# Patient Record
Sex: FEMALE | Race: WHITE | NOT HISPANIC OR LATINO | Employment: FULL TIME | URBAN - NONMETROPOLITAN AREA
[De-identification: names, ages, dates, MRNs, and addresses within clinical notes are randomized per-mention and may not be internally consistent; named-entity substitution may affect disease eponyms.]

---

## 2018-02-14 ENCOUNTER — OFFICE VISIT (OUTPATIENT)
Dept: GASTROENTEROLOGY | Facility: CLINIC | Age: 58
End: 2018-02-14

## 2018-02-14 VITALS
WEIGHT: 158 LBS | HEIGHT: 66 IN | DIASTOLIC BLOOD PRESSURE: 80 MMHG | HEART RATE: 78 BPM | BODY MASS INDEX: 25.39 KG/M2 | SYSTOLIC BLOOD PRESSURE: 138 MMHG | OXYGEN SATURATION: 97 %

## 2018-02-14 DIAGNOSIS — Z12.11 ENCOUNTER FOR SCREENING FOR MALIGNANT NEOPLASM OF COLON: Primary | ICD-10-CM

## 2018-02-14 PROCEDURE — S0285 CNSLT BEFORE SCREEN COLONOSC: HCPCS | Performed by: CLINICAL NURSE SPECIALIST

## 2018-02-14 RX ORDER — SODIUM, POTASSIUM,MAG SULFATES 17.5-3.13G
SOLUTION, RECONSTITUTED, ORAL ORAL
Qty: 2 BOTTLE | Refills: 0 | Status: SHIPPED | OUTPATIENT
Start: 2018-02-14 | End: 2018-05-24 | Stop reason: HOSPADM

## 2018-02-14 RX ORDER — ATORVASTATIN CALCIUM 10 MG/1
10 TABLET, FILM COATED ORAL DAILY
COMMUNITY

## 2018-02-14 NOTE — PROGRESS NOTES
Magda Mcgraw  1960      2/14/2018  Chief Complaint   Patient presents with   • Colonoscopy     New patient ref by Dr. Dayanara SERRANO  Magda Mcgraw is a 58 y.o. female who presents as a referral for preventative maintenance. She has no complaints of nausea or vomiting. No change in bowels. No wt loss. No BRBPR. No melena. There is no family hx for colon cancer. No abdominal pain.  Past Medical History:   Diagnosis Date   • Disease of thyroid gland    • Hypercholesteremia    • Hyperlipidemia      Past Surgical History:   Procedure Laterality Date   • CHOLECYSTECTOMY     • CYSTOSCOPY W/ LITHOLAPAXY / EHL     • HYSTERECTOMY     • TONSILLECTOMY       Outpatient Prescriptions Marked as Taking for the 2/14/18 encounter (Office Visit) with DAY Carrera   Medication Sig Dispense Refill   • atorvastatin (LIPITOR) 10 MG tablet Take 10 mg by mouth Daily.     • clonazePAM (KlonoPIN) 1 MG tablet Take 1 mg by mouth 2 (Two) Times a Day As Needed for Seizures.     • levothyroxine (SYNTHROID, LEVOTHROID) 25 MCG tablet Take 25 mcg by mouth Daily.     • tiZANidine (ZANAFLEX) 4 MG tablet Take 4 mg by mouth 3 (Three) Times a Day.     • traMADol (ULTRAM) 50 MG tablet Take 50 mg by mouth Every 6 (Six) Hours As Needed for Moderate Pain .       No Known Allergies  Social History     Social History   • Marital status:      Spouse name: N/A   • Number of children: N/A   • Years of education: N/A     Occupational History   • Not on file.     Social History Main Topics   • Smoking status: Former Smoker   • Smokeless tobacco: Never Used   • Alcohol use Yes      Comment: Occasional   • Drug use: Not on file   • Sexual activity: Not on file     Other Topics Concern   • Not on file     Social History Narrative     Family History   Problem Relation Age of Onset   • Colon cancer Neg Hx    • Colon polyps Neg Hx      Health Maintenance   Topic Date Due   • TDAP/TD VACCINES (1 - Tdap) 01/10/1977   • INFLUENZA  "VACCINE  08/01/2017   • HEPATITIS C SCREENING  02/09/2018   • MAMMOGRAM  02/09/2018   • PAP SMEAR  02/09/2018   • COLONOSCOPY  02/09/2018   • ZOSTER VACCINE  02/09/2018   • LIPID PANEL  02/14/2018       REVIEW OF SYSTEMS  General: well appearing, no fever chills or sweats, no unexplained wt loss  HEENT: no acute visual or hearing disturbances  Cardiovascular: No chest pain or palpitations  Pulmonary: No shortness of breath, coughing, wheezing or hemoptysis  : No burning, urgency, hematuria, or dysuria  Musculoskeletal: No joint pain or stiffness  Peripheral: no edema  Skin: No lesions or rashes  Neuro: No dizziness, headaches, stroke, syncope  Endocrine: No hot or cold intolerances  Hematological: No blood dyscrasias    Objective   Vitals:    02/14/18 1319   BP: 138/80   Pulse: 78   SpO2: 97%   Weight: 71.7 kg (158 lb)   Height: 167.6 cm (66\")     Body mass index is 25.5 kg/(m^2).  Patient's BMI is within normal parameters. No follow-up required.      PHYSICAL EXAM  General: age appropriate well nourished well appearing, no acute distress  Head: normocephalic and atraumatic  Global assessment-supple  Neck-No JVD noted, no lymphadenopathy  Pulmonary-clear to auscultation bilaterally, normal respiratory effort  Cardiovascular-normal rate and rhythm, normal heart sounds, S1 and S2 noted  Abdomen-soft, non tender, non distended, normal bowel sounds all 4 quadrants, no hepatosplenomegaly noted  Extremities-No clubbing cyanosis or edema  Neuro-Non focal, converses appropriately, awake, alert, oriented    Assessment/Plan     Magda was seen today for colonoscopy.    Diagnoses and all orders for this visit:    Encounter for screening for malignant neoplasm of colon  -     SUPREP BOWEL PREP KIT 17.5-3.13-1.6 GM/180ML solution oral solution; Take as directed by office instructions provided  -     Case Request; Standing  -     Implement Anesthesia Orders Day of Procedure; Standing  -     Obtain Informed Consent; Standing  -  "    Verify bowel prep was successful; Standing  -     Case Request        COLONOSCOPY WITH ANESTHESIA (N/A)  Body mass index is 25.5 kg/(m^2).    Patient instructions on prep prior to procedure provided to the patient.    All risks, benefits, alternatives, and indications of colonoscopy procedure have been discussed with the patient. Risks to include perforation of the colon requiring possible surgery or colostomy, risk of bleeding from biopsies or removal of colon tissue, possibility of missing a colon polyp or cancer, or adverse drug reaction.  Benefits to include the diagnosis and management of disease of the colon and rectum. Alternatives to include barium enema, radiographic evaluation, lab testing or no intervention. Pt verbalizes understanding and agrees.     Shirley Baig, APRN  2/14/2018  1:30 PM

## 2018-05-24 ENCOUNTER — ANESTHESIA EVENT (OUTPATIENT)
Dept: GASTROENTEROLOGY | Facility: HOSPITAL | Age: 58
End: 2018-05-24

## 2018-05-24 ENCOUNTER — ANESTHESIA (OUTPATIENT)
Dept: GASTROENTEROLOGY | Facility: HOSPITAL | Age: 58
End: 2018-05-24

## 2018-05-24 ENCOUNTER — HOSPITAL ENCOUNTER (OUTPATIENT)
Facility: HOSPITAL | Age: 58
Setting detail: HOSPITAL OUTPATIENT SURGERY
Discharge: HOME | End: 2018-05-24
Attending: INTERNAL MEDICINE | Admitting: INTERNAL MEDICINE

## 2018-05-24 VITALS
WEIGHT: 159 LBS | RESPIRATION RATE: 13 BRPM | DIASTOLIC BLOOD PRESSURE: 61 MMHG | HEART RATE: 73 BPM | HEIGHT: 66 IN | BODY MASS INDEX: 25.55 KG/M2 | SYSTOLIC BLOOD PRESSURE: 113 MMHG | TEMPERATURE: 97.4 F | OXYGEN SATURATION: 100 %

## 2018-05-24 DIAGNOSIS — Z12.11 ENCOUNTER FOR SCREENING FOR MALIGNANT NEOPLASM OF COLON: ICD-10-CM

## 2018-05-24 PROCEDURE — G0121 COLON CA SCRN NOT HI RSK IND: HCPCS | Performed by: INTERNAL MEDICINE

## 2018-05-24 PROCEDURE — 25010000002 PROPOFOL 10 MG/ML EMULSION: Performed by: NURSE ANESTHETIST, CERTIFIED REGISTERED

## 2018-05-24 RX ORDER — SODIUM CHLORIDE 9 MG/ML
500 INJECTION, SOLUTION INTRAVENOUS CONTINUOUS PRN
Status: DISCONTINUED | OUTPATIENT
Start: 2018-05-24 | End: 2018-05-24 | Stop reason: HOSPADM

## 2018-05-24 RX ORDER — SODIUM CHLORIDE 0.9 % (FLUSH) 0.9 %
3 SYRINGE (ML) INJECTION AS NEEDED
Status: DISCONTINUED | OUTPATIENT
Start: 2018-05-24 | End: 2018-05-24 | Stop reason: HOSPADM

## 2018-05-24 RX ORDER — PROPOFOL 10 MG/ML
VIAL (ML) INTRAVENOUS AS NEEDED
Status: DISCONTINUED | OUTPATIENT
Start: 2018-05-24 | End: 2018-05-24 | Stop reason: SURG

## 2018-05-24 RX ADMIN — SODIUM CHLORIDE 500 ML: 9 INJECTION, SOLUTION INTRAVENOUS at 08:22

## 2018-05-24 RX ADMIN — PROPOFOL 190 MG: 10 INJECTION, EMULSION INTRAVENOUS at 09:33

## 2018-05-24 RX ADMIN — LIDOCAINE HYDROCHLORIDE 0.5 ML: 10 INJECTION, SOLUTION EPIDURAL; INFILTRATION; INTRACAUDAL; PERINEURAL at 08:22

## 2018-05-24 NOTE — H&P
"[facility]  Pre Procedure History & Physical    Chief Complaint:   Screening    Subjective     HPI:   Screening colonoscopy see office note dated 2/14/2018    Past Medical History:   Past Medical History:   Diagnosis Date   • Disease of thyroid gland    • Hypercholesteremia    • Hyperlipidemia        Past Surgical History:  Past Surgical History:   Procedure Laterality Date   • CHOLECYSTECTOMY     • CYSTOSCOPY W/ LITHOLAPAXY / EHL     • HYSTERECTOMY     • TONSILLECTOMY         Family History:  Family History   Problem Relation Age of Onset   • Colon cancer Neg Hx    • Colon polyps Neg Hx        Social History:   reports that she has quit smoking. She has never used smokeless tobacco. She reports that she drinks alcohol. She reports that she does not use drugs.    Medications:   Prior to Admission medications    Medication Sig Start Date End Date Taking? Authorizing Provider   atorvastatin (LIPITOR) 10 MG tablet Take 10 mg by mouth Daily.   Yes Historical Provider, MD   clonazePAM (KlonoPIN) 1 MG tablet Take 1 mg by mouth 2 (Two) Times a Day As Needed for Seizures.   Yes Historical Provider, MD   levothyroxine (SYNTHROID, LEVOTHROID) 25 MCG tablet Take 25 mcg by mouth Daily.   Yes Historical Provider, MD   SUPREP BOWEL PREP KIT 17.5-3.13-1.6 GM/180ML solution oral solution Take as directed by office instructions provided 2/14/18  Yes DAY Carrera   tiZANidine (ZANAFLEX) 4 MG tablet Take 4 mg by mouth 3 (Three) Times a Day.   Yes Historical Provider, MD   traMADol (ULTRAM) 50 MG tablet Take 50 mg by mouth Every 6 (Six) Hours As Needed for Moderate Pain .   Yes Historical Provider, MD       Allergies:  Patient has no known allergies.    Objective     Blood pressure 133/91, pulse 76, temperature 97.4 °F (36.3 °C), temperature source Temporal Artery , resp. rate 18, height 167.6 cm (66\"), weight 72.1 kg (159 lb), SpO2 98 %.    Physical Exam   Constitutional: Pt is oriented to person, " place, and in no distress.   HENT: Mouth/Throat: Oropharynx is clear.   Cardiovascular: Normal rate, regular rhythm.    Pulmonary/Chest: Effort normal. No respiratory distress. No  wheezes.   Abdominal: Soft. Non-distended.  Skin: Skin is warm and dry.   Psychiatric: Mood, memory, affect and judgment appear normal.     Assessment/Plan     Diagnosis:  Screening colonoscopy    Anticipated Surgical Procedure:    Proceed with colonoscopy as scheduled    The following major R/B/A were discussed with the patient, however the list is not all inclusive . Risk:  Bleeding (immediate and delayed), perforation (rupture or tear), reaction to medication, missed lesion/cancer, pain during the procedure, infection, need for surgery, need for ostomy, need for mechanical ventilation (breathing machine), death.  Benefits: removal of polyp/tissue, burn/clip/or inject to stop bleeding, removal of foreign body, dilate any stricture.  Alternatives: Xray or CT, surgery, do nothing with associated risk   The patient was given time to ask question and received explanation, and agrees to proceed as per History and Physical.   No guarantee given or expressed.    EMR Dragon/transcription disclaimer: Much of this encounter note is an electronic transcription/translation of spoken language to printed text.  The electronic translation of spoken language may permit erroneous, or at times, nonsensical words or phrases to be inadvertently transcribed.  Although I have reviewed the note for such errors, some may still exist.    Frank Tripp MD  9:29 AM  5/24/2018

## 2018-05-24 NOTE — ANESTHESIA POSTPROCEDURE EVALUATION
"Patient: Magda Mcgraw    Procedure Summary     Date:  05/24/18 Room / Location:   [facility] /  [facility]    Anesthesia Start:  0928 Anesthesia Stop:  0945    Procedure:  COLONOSCOPY WITH ANESTHESIA (N/A ) Diagnosis:       Encounter for screening for malignant neoplasm of colon      (Encounter for screening for malignant neoplasm of colon [Z12.11])    Surgeon:  Frank Tripp MD Provider:  Astrid Velazquez CRNA    Anesthesia Type:  general ASA Status:  2          Anesthesia Type: general  Last vitals  BP   113/61 (05/24/18 1005)   Temp   97.4 °F (36.3 °C) (05/24/18 0800)   Pulse   73 (05/24/18 1005)   Resp   13 (05/24/18 1005)     SpO2   100 % (05/24/18 1005)     Post Anesthesia Care and Evaluation    Patient location during evaluation: bedside  Patient participation: complete - patient participated  Level of consciousness: awake and awake and alert  Pain score: 0  Pain management: adequate  Airway patency: patent  Anesthetic complications: No anesthetic complications  PONV Status: none  Cardiovascular status: acceptable  Respiratory status: acceptable  Hydration status: acceptable    Comments: Patient discharged according to acceptable Armaan score per RN assessment. See nursing records for further information.     Blood pressure 113/61, pulse 73, temperature 97.4 °F (36.3 °C), temperature source Temporal Artery , resp. rate 13, height 167.6 cm (66\"), weight 72.1 kg (159 lb), SpO2 100 %.        "

## 2018-05-24 NOTE — ANESTHESIA PREPROCEDURE EVALUATION
Anesthesia Evaluation     Patient summary reviewed and Nursing notes reviewed   NPO Solid Status: > 8 hours  NPO Liquid Status: > 2 hours           Airway   Mallampati: I  TM distance: <3 FB  Neck ROM: full  no difficulty expected  Dental - normal exam     Pulmonary - negative pulmonary ROS and normal exam   Cardiovascular - normal exam  Exercise tolerance: good (4-7 METS)    (+) hyperlipidemia,       Neuro/Psych- negative ROS  GI/Hepatic/Renal/Endo    (+)   hypothyroidism,     Musculoskeletal (-) negative ROS    Abdominal  - normal exam    Bowel sounds: normal.   Substance History - negative use     OB/GYN negative ob/gyn ROS         Other                        Anesthesia Plan    ASA 2     general     intravenous induction   Anesthetic plan and risks discussed with patient.

## 2018-05-25 ENCOUNTER — TELEPHONE (OUTPATIENT)
Dept: GASTROENTEROLOGY | Facility: CLINIC | Age: 58
End: 2018-05-25

## 2023-10-25 ENCOUNTER — TRANSCRIBE ORDERS (OUTPATIENT)
Dept: ADMINISTRATIVE | Facility: HOSPITAL | Age: 63
End: 2023-10-25
Payer: COMMERCIAL

## 2023-10-25 DIAGNOSIS — Z12.31 ENCOUNTER FOR SCREENING MAMMOGRAM FOR MALIGNANT NEOPLASM OF BREAST: Primary | ICD-10-CM

## 2023-11-09 LAB — NCCN CRITERIA FLAG: ABNORMAL

## 2023-11-09 NOTE — PROGRESS NOTES
This patient recently took the CARE risk assessment for a mammogram appointment. Based on the patient's responses, NCCN criteria for genetic testing was met.  The patient has declined testing at this time.

## 2023-11-14 ENCOUNTER — APPOINTMENT (OUTPATIENT)
Dept: OTHER | Facility: HOSPITAL | Age: 63
End: 2023-11-14
Payer: COMMERCIAL

## 2023-11-14 ENCOUNTER — HOSPITAL ENCOUNTER (OUTPATIENT)
Dept: MAMMOGRAPHY | Facility: HOSPITAL | Age: 63
Discharge: HOME | End: 2023-11-14
Admitting: FAMILY MEDICINE
Payer: COMMERCIAL

## 2023-11-14 DIAGNOSIS — Z12.31 ENCOUNTER FOR SCREENING MAMMOGRAM FOR MALIGNANT NEOPLASM OF BREAST: ICD-10-CM

## 2023-11-14 PROCEDURE — 77063 BREAST TOMOSYNTHESIS BI: CPT

## 2023-11-14 PROCEDURE — 77067 SCR MAMMO BI INCL CAD: CPT

## 2024-08-15 RX ORDER — EZETIMIBE 10 MG/1
10 TABLET ORAL DAILY
COMMUNITY
End: 2024-08-16 | Stop reason: ALTCHOICE

## 2024-08-15 RX ORDER — LIOTHYRONINE SODIUM 5 UG/1
5 TABLET ORAL DAILY
COMMUNITY

## 2024-08-15 RX ORDER — LEVOTHYROXINE SODIUM 0.07 MG/1
75 TABLET ORAL DAILY
COMMUNITY

## 2024-08-16 ENCOUNTER — OFFICE VISIT (OUTPATIENT)
Dept: NEUROLOGY | Facility: CLINIC | Age: 64
End: 2024-08-16
Payer: MEDICARE

## 2024-08-16 VITALS
RESPIRATION RATE: 17 BRPM | WEIGHT: 144 LBS | SYSTOLIC BLOOD PRESSURE: 124 MMHG | HEIGHT: 66 IN | HEART RATE: 68 BPM | DIASTOLIC BLOOD PRESSURE: 64 MMHG | BODY MASS INDEX: 23.14 KG/M2

## 2024-08-16 DIAGNOSIS — G43.719 INTRACTABLE CHRONIC MIGRAINE WITHOUT AURA AND WITHOUT STATUS MIGRAINOSUS: Primary | ICD-10-CM

## 2024-08-16 DIAGNOSIS — G44.229 CHRONIC TENSION-TYPE HEADACHE, NOT INTRACTABLE: ICD-10-CM

## 2024-08-16 RX ORDER — BUTALBITAL, ACETAMINOPHEN AND CAFFEINE 50; 325; 40 MG/1; MG/1; MG/1
1 TABLET ORAL EVERY 4 HOURS PRN
COMMUNITY

## 2024-08-16 RX ORDER — METHYLPREDNISOLONE 4 MG/1
TABLET ORAL
Qty: 21 TABLET | Refills: 0 | Status: SHIPPED | OUTPATIENT
Start: 2024-08-16

## 2024-08-16 NOTE — PROGRESS NOTES
Neurology Consult Note    [facility]  [address], [address]  [place] [address]  Phone: [phone]  Fax: [phone]    Referring Provider:   Shawna Junior, *  Primary Care Provider:  Orlando Nichole MD    Reason for Consult:  Migraine  Subjective       Magda Mcgraw presents to [facility]    History of Present Illness  64-year-old female referred to our clinic by Shawna Junior, *with Orlando Nichole MD for the evaluation of migraine.  Patient was originally referred to us in September 2023.  She had an appointment scheduled in January 2024 however canceled that appointment.    Reviewed her record shows received a progress note dated 9/5/2023.  Patient was seen for 3-month follow-up.  Patient reporting she was still experiencing migraines.  She noted the last 3 weeks he is only had 4 migraine free days.  It appears her migraine she was started on Qulipta she was continued on Fioricet.  She is also continued on rizatriptan.    Today patient presents by herself.  Reports to me a longstanding history of chronic migraine headaches.  This is started around the age of 10 when she began menstruating.  She also had migraines that were worse with pregnancy.  She was referred to us last year.  She noticed in December 2022 her migraines worsened.  These do start in the back of her head.  Initially they would not start as an ache and then progressed into a throbbing sensation.  She described it as someone being on a gas pedal and rating up the pain.  She did have some stomach upset.  Not a great deal of nausea.  She did have some light sensitivity.  Minimal sound sensitivities.  These were occurring almost daily.  She denied any brief sharp electrical or shooting pains.  Denied any significant head tenderness such as when lying down or brushing her hair.    These have now progressed into having a headache nearly every day.  This is described as a  dull ache.  She does have tension in her neck.  Located in the back of her head.  She will take Fioricet or Excedrin frequently for these.  She notes having migraines now approximately 2 to 4 days monthly.    She described her sleep is okay.  She does take CBD Gummies to help her sleep.  She does feel rested in the mornings.    She has been tried on several abortive medications in the past.  Unfortunate she has had limited benefit to these as well as preventative regimens.  Due to her age she does have several contraindications to some frontline medicines.      Migraine preventative Medications Tried:   Qulipta.  No clinical benefit.  Propranolol. No benefit. Caused low blood pressure and heart rate.     Migraine abortive Medications Tried:   Fioricet  Excedrin  Maxalt- caused low blood pressure   Sumatriptan- no benefit.   Nurtec- no benefit.   Ubrelvy- no benefit  Zanaflex. Unknown benefit.     Migraine medications Contraindicated  Blood pressure medications due to history of bradycardia and hypotension.   Antidepressant medications due to patient age.   Anticonvulsants medications due to patient age.   Patient Active Problem List   Diagnosis    Encounter for screening for malignant neoplasm of colon        Past Medical History:   Diagnosis Date    Breast cancer 02/1999    rt    Disease of thyroid gland     Hx of radiation therapy     Hypercholesteremia     Hyperlipidemia         Social History     Socioeconomic History    Marital status:    Tobacco Use    Smoking status: Former    Smokeless tobacco: Never   Vaping Use    Vaping status: Never Used   Substance and Sexual Activity    Alcohol use: Yes     Comment: Occasional    Drug use: No    Sexual activity: Defer        Allergies   Allergen Reactions    Azithromycin Other (See Comments)     Redness          Current Outpatient Medications:     aspirin-acetaminophen-caffeine (EXCEDRIN MIGRAINE) 250-250-65 MG per tablet, Take 1 tablet by mouth Every 6 (Six)  "Hours As Needed for Headache., Disp: , Rfl:     clonazePAM (KlonoPIN) 1 MG tablet, Take 1 tablet by mouth 2 (Two) Times a Day As Needed for Seizures., Disp: , Rfl:     ESTRADIOL PO, Take  by mouth., Disp: , Rfl:     levothyroxine (SYNTHROID, LEVOTHROID) 75 MCG tablet, Take  by mouth Daily., Disp: , Rfl:     levothyroxine (SYNTHROID, LEVOTHROID) 75 MCG tablet, Take 1 tablet by mouth Daily., Disp: , Rfl:     liothyronine (CYTOMEL) 5 MCG tablet, Take 1 tablet by mouth Daily., Disp: , Rfl:     PROGESTERONE PO, Take  by mouth., Disp: , Rfl:     tiZANidine (ZANAFLEX) 4 MG tablet, Take 1 tablet by mouth Every 6 (Six) Hours As Needed., Disp: , Rfl:     traMADol (ULTRAM) 50 MG tablet, Take 1 tablet by mouth Every 6 (Six) Hours As Needed for Moderate Pain., Disp: , Rfl:     butalbital-acetaminophen-caffeine (FIORICET, ESGIC) -40 MG per tablet, Take 1 tablet by mouth Every 4 (Four) Hours As Needed for Headache., Disp: , Rfl:     methylPREDNISolone (MEDROL) 4 MG dose pack, Take as directed on package instructions., Disp: 21 tablet, Rfl: 0       Objective   Vital Signs:   /64 (BP Location: Left arm, Patient Position: Sitting)   Pulse 68   Resp 17   Ht 167.6 cm (66\")   Wt 65.3 kg (144 lb)   BMI 23.24 kg/m²       Physical Exam  Vitals and nursing note reviewed.   Constitutional:       Appearance: Normal appearance.   HENT:      Head: Normocephalic and atraumatic.      Comments: No occipital tenderness or temporal tenderness.  Eyes:      General: Lids are normal.      Extraocular Movements: Extraocular movements intact.      Pupils: Pupils are equal, round, and reactive to light.   Pulmonary:      Effort: Pulmonary effort is normal. No respiratory distress.   Skin:     General: Skin is warm and dry.   Neurological:      Mental Status: She is alert.      Motor: Motor strength is normal.     Deep Tendon Reflexes: Reflexes are normal and symmetric.   Psychiatric:         Speech: Speech normal.        Neurological " Exam  Mental Status  Alert. Oriented to person, place, time and situation. Speech is normal. Language is fluent with no aphasia.    Cranial Nerves  CN II: Visual fields full to confrontation.  CN III, IV, VI: Extraocular movements intact bilaterally. Normal lids and orbits bilaterally. Pupils equal round and reactive to light bilaterally.  CN V: Facial sensation is normal.  CN VII: Full and symmetric facial movement.  CN IX, X: Palate elevates symmetrically. Normal gag reflex.  CN XI: Shoulder shrug strength is normal.  CN XII: Tongue midline without atrophy or fasciculations.    Motor  Normal muscle bulk throughout. No fasciculations present. Normal muscle tone. No abnormal involuntary movements. Strength is 5/5 throughout all four extremities.    Sensory  Light touch is normal in upper and lower extremities.     Reflexes  Deep tendon reflexes are 2+ and symmetric in all four extremities.    Coordination  Right: Finger-to-nose normal.Left: Finger-to-nose normal.    Gait  Casual gait is normal including stance, stride, and arm swing.      Result Review :   The following data was reviewed by: DAY Dior on 08/16/2024:       PROGRESS NOTES - SCAN - PROG NOTE_[facility] MED_09/05/23 (09/05/2023)                Impression:  Magda Mcgraw is a 64 y.o. female who presents for evaluation of migraine.  Certainly patient has a history of chronic migraines.  Unfortunately she is also having chronic daily headaches as well.  Unfortunately she has tried propranolol in the past however had adverse effects with this.  She is also tried on Qulipta through primary care however had no benefit.  Due to her age, I feel she be a great candidate for Botox for migraine prevention.  Additionally we discussed the use of Fioricet and Excedrin which can make headaches worse.  We will send her with a steroid taper to help with the daily headaches.    Diagnoses and all orders for this visit:    1. Intractable chronic migraine  without aura and without status migrainosus (Primary)  -     Ambulatory Referral for In-Office Procedure Authorization  -     methylPREDNISolone (MEDROL) 4 MG dose pack; Take as directed on package instructions.  Dispense: 21 tablet; Refill: 0    2. Chronic tension-type headache, not intractable        Plan:  Continue Fioricet for migraine .  Recommend limiting Fioricet to no more than 9 tablets monthly  Stop use of Excedrin  Medrol Dosepak.  Take according to label directions  Referral to Botox clinic for migraine prevention  Avoid known triggers  Contact our office with any new or worsening symptoms  Follow-up with primary care as scheduled  Follow-up in our clinic in 6 months or sooner if needed    The patient and I have discussed the plan of care and she is in full agreement at this time.     Follow Up   Return in about 6 months (around 2025) for Migraine.            DAY Dior  24  14:20 CDT

## 2024-08-20 ENCOUNTER — TELEPHONE (OUTPATIENT)
Dept: NEUROLOGY | Facility: CLINIC | Age: 64
End: 2024-08-20
Payer: MEDICARE

## 2024-08-20 NOTE — TELEPHONE ENCOUNTER
I am trying to call Mrs. Mcgraw to schedule her Botox appt with Dr. Hawley. However, I have called the number listed x2 & someone answers but immediately hangs up. She is welcome to call the office to get an appt set at [phone].      OK FOR HUB TO RELAY

## 2024-08-21 ENCOUNTER — TELEPHONE (OUTPATIENT)
Dept: NEUROLOGY | Facility: CLINIC | Age: 64
End: 2024-08-21
Payer: MEDICARE

## 2024-08-21 NOTE — TELEPHONE ENCOUNTER
[facility] staff attempted to follow warm transfer process and was unsuccessful- NO ANSWER.    Caller: Magda Mcgraw    Relationship to patient: Self    Best call back number: [phone]    Reason for call: PT ATTEMPTING TO CALL BACK TO SCHEDULE BOTOX APPT.    PLEASE REVIEW AND ADVISE.

## 2024-08-21 NOTE — TELEPHONE ENCOUNTER
I spoke with Mrs. Mcgraw regarding scheduling her Botox. Dr. Hawley has an openings for Botox Clinic on 9/20/24 & Mrs. Mcgraw did accept an 11:30 appt time; however, she would like to come sooner, if there is a cancellation.

## 2024-08-30 ENCOUNTER — PROCEDURE VISIT (OUTPATIENT)
Dept: NEUROLOGY | Facility: CLINIC | Age: 64
End: 2024-08-30
Payer: MEDICARE

## 2024-08-30 DIAGNOSIS — G43.719 INTRACTABLE CHRONIC MIGRAINE WITHOUT AURA AND WITHOUT STATUS MIGRAINOSUS: Primary | ICD-10-CM

## 2024-08-30 RX ORDER — ERGOCALCIFEROL 1.25 MG/1
50000 CAPSULE, LIQUID FILLED ORAL
COMMUNITY
Start: 2024-05-27

## 2024-09-26 ENCOUNTER — TRANSCRIBE ORDERS (OUTPATIENT)
Dept: ADMINISTRATIVE | Facility: HOSPITAL | Age: 64
End: 2024-09-26
Payer: MEDICARE

## 2024-09-26 DIAGNOSIS — M81.0 SENILE OSTEOPOROSIS: Primary | ICD-10-CM

## 2024-09-27 ENCOUNTER — TRANSCRIBE ORDERS (OUTPATIENT)
Dept: ADMINISTRATIVE | Facility: HOSPITAL | Age: 64
End: 2024-09-27
Payer: COMMERCIAL

## 2024-09-27 DIAGNOSIS — Z12.31 ENCOUNTER FOR SCREENING MAMMOGRAM FOR MALIGNANT NEOPLASM OF BREAST: Primary | ICD-10-CM

## 2024-11-18 ENCOUNTER — HOSPITAL ENCOUNTER (OUTPATIENT)
Dept: MAMMOGRAPHY | Facility: HOSPITAL | Age: 64
Discharge: HOME | End: 2024-11-18
Payer: MEDICARE

## 2024-11-18 ENCOUNTER — HOSPITAL ENCOUNTER (OUTPATIENT)
Dept: BONE DENSITY | Facility: HOSPITAL | Age: 64
Discharge: HOME | End: 2024-11-18
Payer: MEDICARE

## 2024-11-18 DIAGNOSIS — Z12.31 ENCOUNTER FOR SCREENING MAMMOGRAM FOR MALIGNANT NEOPLASM OF BREAST: ICD-10-CM

## 2024-11-18 DIAGNOSIS — M81.0 SENILE OSTEOPOROSIS: ICD-10-CM

## 2024-11-18 PROCEDURE — 77063 BREAST TOMOSYNTHESIS BI: CPT

## 2024-11-18 PROCEDURE — 77080 DXA BONE DENSITY AXIAL: CPT

## 2024-11-18 PROCEDURE — 77067 SCR MAMMO BI INCL CAD: CPT

## 2024-12-13 ENCOUNTER — PROCEDURE VISIT (OUTPATIENT)
Dept: NEUROLOGY | Facility: CLINIC | Age: 64
End: 2024-12-13
Payer: MEDICARE

## 2024-12-13 DIAGNOSIS — G43.719 INTRACTABLE CHRONIC MIGRAINE WITHOUT AURA AND WITHOUT STATUS MIGRAINOSUS: Primary | ICD-10-CM

## 2025-01-13 ENCOUNTER — OFFICE VISIT (OUTPATIENT)
Dept: NEUROLOGY | Facility: CLINIC | Age: 65
End: 2025-01-13
Payer: MEDICARE

## 2025-01-13 VITALS
OXYGEN SATURATION: 96 % | HEART RATE: 118 BPM | RESPIRATION RATE: 16 BRPM | DIASTOLIC BLOOD PRESSURE: 70 MMHG | SYSTOLIC BLOOD PRESSURE: 122 MMHG

## 2025-01-13 DIAGNOSIS — G44.40 MEDICATION OVERUSE HEADACHE: ICD-10-CM

## 2025-01-13 DIAGNOSIS — G43.719 INTRACTABLE CHRONIC MIGRAINE WITHOUT AURA AND WITHOUT STATUS MIGRAINOSUS: Primary | ICD-10-CM

## 2025-01-13 DIAGNOSIS — M54.2 NECK PAIN: ICD-10-CM

## 2025-01-13 PROCEDURE — 1159F MED LIST DOCD IN RCRD: CPT

## 2025-01-13 PROCEDURE — 1160F RVW MEDS BY RX/DR IN RCRD: CPT

## 2025-01-13 PROCEDURE — 99215 OFFICE O/P EST HI 40 MIN: CPT

## 2025-01-13 RX ORDER — PREDNISONE 20 MG/1
20 TABLET ORAL 3 TIMES DAILY
Qty: 15 TABLET | Refills: 0 | Status: SHIPPED | OUTPATIENT
Start: 2025-01-13 | End: 2025-01-18

## 2025-01-13 RX ORDER — ZAVEGEPANT 10 MG/.1ML
1 SPRAY NASAL AS NEEDED
Qty: 2 EACH | Refills: 0 | COMMUNITY
Start: 2025-01-13

## 2025-01-13 RX ORDER — ZAVEGEPANT 10 MG/.1ML
1 SPRAY NASAL AS NEEDED
Qty: 9 EACH | Refills: 2 | Status: SHIPPED | OUTPATIENT
Start: 2025-01-13

## 2025-01-13 NOTE — PROGRESS NOTES
Neurology Consult Note    [facility]  [address], [address]  [place] [address]  Phone: [phone]  Fax: [phone]    Referring Provider:   No ref. provider found  Primary Care Provider:  Orlando Nichole MD    Reason for Consult:  Chronic migraine  Subjective        Magda Mcgraw presents to [facility]    History of Present Illness  65-year-old female seen for follow-up of chronic migraine.  Last seen in clinic 2024.  We did refer patient to her Botox clinic.  Additionally continue patient on Fioricet for  with recommendation to limit to no more than 9 tablets monthly.  Patient was also overusing Excedrin which would recommend her to stop that.  Her last Botox series was on 2024.    Today patient presents by herself.  Reports to me some difference in the intensity however her frequency remains unchanged regards to her migraines.  She is only had approximately 2 or 3 days migraine free this month.  She is still hopeful she may get some benefit.  Unfortunate she does continue to utilize Excedrin and Fioricet quite frequently.  She notes Excedrin almost every day and Fioricet at least twice daily.  At last visit we did encourage patient to cease use of these.  We did provide her with a Medrol Dosepak.  She did note some improvement with her headaches while on steroids however they returned as she continues in the abortive medications.      Migraine preventative Medications Tried:   Qulipta.  No clinical benefit.  Propranolol. No benefit. Caused low blood pressure and heart rate.   Amitriptyline.      Migraine abortive Medications Tried:   Fioricet  Excedrin  Maxalt- caused low blood pressure   Sumatriptan- no benefit.   Nurtec- no benefit.   Ubrelvy- no benefit  Zanaflex. Unknown benefit.        Migraine medications Contraindicated  Blood pressure medications due to history of bradycardia and hypotension.   Antidepressant medications due  to patient age.   Anticonvulsants medications due to patient age.   Patient Active Problem List   Diagnosis    Encounter for screening for malignant neoplasm of colon        Past Medical History:   Diagnosis Date    Breast cancer 02/1999    rt    Disease of thyroid gland     Hx of radiation therapy     Hypercholesteremia     Hyperlipidemia         Social History     Socioeconomic History    Marital status:    Tobacco Use    Smoking status: Former    Smokeless tobacco: Never   Vaping Use    Vaping status: Never Used   Substance and Sexual Activity    Alcohol use: Yes     Comment: Occasional    Drug use: No    Sexual activity: Defer        Allergies   Allergen Reactions    Azithromycin Other (See Comments)     Redness          Current Outpatient Medications:     aspirin-acetaminophen-caffeine (EXCEDRIN MIGRAINE) 250-250-65 MG per tablet, Take 1 tablet by mouth Every 6 (Six) Hours As Needed for Headache., Disp: , Rfl:     butalbital-acetaminophen-caffeine (FIORICET, ESGIC) -40 MG per tablet, Take 1 tablet by mouth Every 4 (Four) Hours As Needed for Headache., Disp: , Rfl:     levothyroxine (SYNTHROID, LEVOTHROID) 75 MCG tablet, Take 50 mcg by mouth Daily. Pt states she is taking  50 mcg a day, Disp: , Rfl:     liothyronine (CYTOMEL) 5 MCG tablet, Take 1 tablet by mouth Daily., Disp: , Rfl:     tiZANidine (ZANAFLEX) 4 MG tablet, Take 1 tablet by mouth Every 6 (Six) Hours As Needed., Disp: , Rfl:     traMADol (ULTRAM) 50 MG tablet, Take 1 tablet by mouth Every 6 (Six) Hours As Needed for Moderate Pain., Disp: , Rfl:     predniSONE (DELTASONE) 20 MG tablet, Take 1 tablet by mouth 3 times a day for 5 days., Disp: 15 tablet, Rfl: 0    Zavegepant HCl (Zavzpret) 10 MG/ACT solution, Administer 1 spray into the nostril(s) as directed by provider As Needed (at onset of migraine)., Disp: 9 each, Rfl: 2       Objective   Vital Signs:   /70   Pulse 118   Resp 16   SpO2 96%       Physical Exam  Vitals and  nursing note reviewed.   Constitutional:       Appearance: Normal appearance.   HENT:      Head: Normocephalic.      Comments: No occipital point tenderness  Eyes:      General: Lids are normal.      Extraocular Movements: Extraocular movements intact.      Pupils: Pupils are equal, round, and reactive to light.   Pulmonary:      Effort: Pulmonary effort is normal. No respiratory distress.   Skin:     General: Skin is warm and dry.   Neurological:      Mental Status: She is alert.   Psychiatric:         Speech: Speech normal.        Neurological Exam  Mental Status  Alert. Oriented to person, place, time and situation. Speech is normal. Language is fluent with no aphasia.    Cranial Nerves  CN II: Visual fields full to confrontation.  CN III, IV, VI: Extraocular movements intact bilaterally. Normal lids and orbits bilaterally. Pupils equal round and reactive to light bilaterally.  CN V: Facial sensation is normal.  CN VII: Full and symmetric facial movement.  CN IX, X: Palate elevates symmetrically. Normal gag reflex.  CN XI: Shoulder shrug strength is normal.  CN XII: Tongue midline without atrophy or fasciculations.    Gait  Casual gait is normal including stance, stride, and arm swing.      Result Review :   The following data was reviewed by: DAY Dior on 01/13/2025:       Progress Notes by Karen Amor APRN (08/16/2024 13:00)                Impression:  Magda Mcgraw is a 65 y.o. female who presents for follow-up of chronic migraine.  Unfortunately patient does continue with chronic migraines.  She only had 2 rounds of Botox.  I would recommend her to continue Botox regimen.  Addition with her complaints of neck tightness, I do think should be of benefit to receive some physical therapy.  This may also help with migraines.  I do have concern she may be experiencing medication overuse headaches.  However with the near daily headaches, I do think it be beneficial to perform an MRI of her  brain.  She has not had 1 performed.  Especially with her age being over 65 1 rule out any other secondary headache syndromes.  We will try patient on Zavzpret for .  Unfortunate she has been on several triptans that caused side effects send no benefit with Nurtec or Ubrelvy.  We will give her prednisone 60 mg for 5 days as a bridge to try to break the use of Fioricet and Excedrin.    Diagnoses and all orders for this visit:    1. Intractable chronic migraine without aura and without status migrainosus (Primary)  -     MRI Brain Without Contrast; Future  -     Zavegepant HCl (Zavzpret) 10 MG/ACT solution; Administer 1 spray into the nostril(s) as directed by provider As Needed (at onset of migraine).  Dispense: 9 each; Refill: 2  -     Ambulatory Referral to Physical Therapy for Evaluation & Treatment  -     predniSONE (DELTASONE) 20 MG tablet; Take 1 tablet by mouth 3 times a day for 5 days.  Dispense: 15 tablet; Refill: 0    2. Neck pain  -     Ambulatory Referral to Physical Therapy for Evaluation & Treatment    3. Medication overuse headache  -     predniSONE (DELTASONE) 20 MG tablet; Take 1 tablet by mouth 3 times a day for 5 days.  Dispense: 15 tablet; Refill: 0        Plan:  Continue Botox for chronic migraine prevention.  Next series due on 2025  Prednisone 20 mg tablet, 1 tablet 3 times daily for 5 days  Stop use of Excedrin  Stop use of Fioricet  Consider Emgality or  Namenda for chronic prevention  Ensure B complex vitamin at home contains at least 400 mg of B2 daily  Referral to physical therapy for chronic neck tension and migraines  MRI brain without contrast  Contact her office with an update  Follow-up with PCP as scheduled  Follow-up in my clinic 7 to 8 weeks or sooner if needed    The patient and I have discussed the plan of care and she is in full agreement at this time.   I spent a total of 40 minutes on this encounter today.  This includes reviewing prior records, obtaining a  thorough HPI, assessment of patient, developing plan of care with the patient, extensive patient discussion, patient education as well as documentation.  I did spend approximately 30 minutes face-to-face with the patient today.  Follow Up   Return in about 7 weeks (around 3/6/2025) for Migraine.            Karen Amor, APRN  01/13/25  10:48 CST

## 2025-01-31 ENCOUNTER — TELEPHONE (OUTPATIENT)
Dept: NEUROLOGY | Facility: CLINIC | Age: 65
End: 2025-01-31
Payer: COMMERCIAL

## 2025-01-31 NOTE — TELEPHONE ENCOUNTER
CALLED PATIENT TO LET HER KNOW I GOT HER AUTHORIZATION REQUEST FOR HER ZAVZPRET. I LET HER KNOW THAT I HAD CALLED HER PHARMACY TO CONFIRM HER PRESCRIPTION INSURANCE VERSUS WHAT WE HAVE AS I WAS UNABLE TO DO AN AUTH. I REQUESTED A COPY OF THE INSURANCE CARD FRONT AND BACK AS WELL AS ANY PRESCRIPTION CARD BE SENT THROUGH [employer]. PATIENT DID NOT ANSWER, I DID LEAVE THIS IN DETAIL ON HER VOICEMAIL.

## 2025-03-05 ENCOUNTER — TELEPHONE (OUTPATIENT)
Dept: NEUROLOGY | Facility: CLINIC | Age: 65
End: 2025-03-05
Payer: COMMERCIAL

## 2025-03-05 NOTE — TELEPHONE ENCOUNTER
I HAVE TRIED CALLING PATIENT X2 TO LET HER KNOW THAT HER BOTOX APPT SHE HAD SCHEDULED ON 3/7/2025 WITH DR. FINCH HAD TO BE CANCELLED AND R/S TO 3/25/2025 AT 11:30 AM.    I WAS UNABLE TO REACH PT BY PHONE AND VOICE MAIL WAS FULL. A [employer] MESSAGE WAS SENT AND HAS NOT BEEN READ YET, AS WELL AS A REMINDER BY MAIL WHICH WAS RETURNED BACK TO US BECAUSE ADDRESS IS NOT CORRECT.     OK FOR HUB TO RELAY MESSAGE.    CC

## 2025-03-25 ENCOUNTER — PROCEDURE VISIT (OUTPATIENT)
Dept: NEUROLOGY | Facility: CLINIC | Age: 65
End: 2025-03-25
Payer: MEDICARE

## 2025-03-25 DIAGNOSIS — G43.719 INTRACTABLE CHRONIC MIGRAINE WITHOUT AURA AND WITHOUT STATUS MIGRAINOSUS: Primary | ICD-10-CM

## 2025-03-25 PROCEDURE — 64615 CHEMODENERV MUSC MIGRAINE: CPT | Performed by: PSYCHIATRY & NEUROLOGY

## 2025-03-25 RX ORDER — ESTRADIOL 0.5 MG/1
TABLET ORAL
COMMUNITY
Start: 2025-03-22

## 2025-03-25 RX ORDER — CLONAZEPAM 1 MG/1
TABLET ORAL EVERY 12 HOURS SCHEDULED
COMMUNITY
Start: 2025-02-18

## 2025-05-13 ENCOUNTER — HOSPITAL ENCOUNTER (EMERGENCY)
Facility: HOSPITAL | Age: 65
Discharge: HOME | End: 2025-05-13
Admitting: EMERGENCY MEDICINE
Payer: MEDICARE

## 2025-05-13 VITALS
HEART RATE: 86 BPM | BODY MASS INDEX: 24.75 KG/M2 | TEMPERATURE: 98.2 F | RESPIRATION RATE: 16 BRPM | WEIGHT: 154 LBS | SYSTOLIC BLOOD PRESSURE: 165 MMHG | OXYGEN SATURATION: 98 % | DIASTOLIC BLOOD PRESSURE: 85 MMHG | HEIGHT: 66 IN

## 2025-05-13 DIAGNOSIS — S01.511A LIP LACERATION, INITIAL ENCOUNTER: Primary | ICD-10-CM

## 2025-05-13 PROCEDURE — 25010000002 LIDOCAINE 1 % SOLUTION: Performed by: NURSE PRACTITIONER

## 2025-05-13 PROCEDURE — 99282 EMERGENCY DEPT VISIT SF MDM: CPT

## 2025-05-13 RX ORDER — LIDOCAINE HYDROCHLORIDE 10 MG/ML
10 INJECTION, SOLUTION INFILTRATION; PERINEURAL ONCE
Status: COMPLETED | OUTPATIENT
Start: 2025-05-13 | End: 2025-05-13

## 2025-05-13 RX ADMIN — LIDOCAINE HYDROCHLORIDE 10 ML: 10 INJECTION, SOLUTION INFILTRATION; PERINEURAL at 11:52

## 2025-05-13 NOTE — ED PROVIDER NOTES
Subjective   History of Present Illness  Patient is a 65-year-old female presents to the emergency department with laceration.  Patient states she tripped over a bathroom rug and fell on her face on the tile shower this morning.  She denies any loss of consciousness.  She sustained a laceration to her lower lip.  To the ear aspect of the outer aspect.  No loss of consciousness no blood thinners.  No dental injury.  No jaw pain.  No syncope or chest pain prior to the fall.  She states she just tripped over a bathroom rug.  She states she does not want a tetanus shot.    History provided by:  Patient   used: No        Review of Systems   Constitutional: Negative.    HENT:          Patient is a 65-year-old female presents to the emergency department with laceration.  Patient states she tripped over a bathroom rug and fell on her face on the tile shower this morning.  She denies any loss of consciousness.  She sustained a laceration to her lower lip.  To the ear aspect of the outer aspect.  No loss of consciousness no blood thinners.  No dental injury.  No jaw pain.  No syncope or chest pain prior to the fall.  She states she just tripped over a bathroom rug.  She states she does not want a tetanus shot.     Eyes: Negative.    Respiratory: Negative.     Cardiovascular: Negative.    Gastrointestinal: Negative.    Endocrine: Negative.    Genitourinary: Negative.    Musculoskeletal: Negative.    Skin: Negative.    Allergic/Immunologic: Negative.    Neurological: Negative.    Hematological: Negative.    Psychiatric/Behavioral: Negative.     All other systems reviewed and are negative.      Past Medical History:   Diagnosis Date    Breast cancer 02/1999    rt    Disease of thyroid gland     Hx of radiation therapy     Hypercholesteremia     Hyperlipidemia        Allergies   Allergen Reactions    Azithromycin Other (See Comments)     Redness       Past Surgical History:   Procedure Laterality Date     CHOLECYSTECTOMY      COLONOSCOPY N/A 5/24/2018    Procedure: COLONOSCOPY WITH ANESTHESIA;  Surgeon: Frank Tripp MD;  Location: [facility] ENDOSCOPY;  Service: Gastroenterology    CYSTOSCOPY W/ LITHOLAPAXY / EHL      HYSTERECTOMY      TONSILLECTOMY         Family History   Problem Relation Age of Onset    Breast cancer Mother 62    Breast cancer Sister 48    Breast cancer Maternal Aunt     Breast cancer Maternal Aunt     Colon cancer Neg Hx     Colon polyps Neg Hx        Social History     Socioeconomic History    Marital status:    Tobacco Use    Smoking status: Former    Smokeless tobacco: Never   Vaping Use    Vaping status: Never Used   Substance and Sexual Activity    Alcohol use: Yes     Comment: Occasional    Drug use: No    Sexual activity: Defer       Prior to Admission medications    Medication Sig Start Date End Date Taking? Authorizing Provider   aspirin-acetaminophen-caffeine (EXCEDRIN MIGRAINE) 250-250-65 MG per tablet Take 1 tablet by mouth Every 6 (Six) Hours As Needed for Headache.    Clau Encarnacion MD   butalbital-acetaminophen-caffeine (FIORICET, ESGIC) -40 MG per tablet Take 1 tablet by mouth Every 4 (Four) Hours As Needed for Headache.    Clau Encarnacion MD   clonazePAM (KlonoPIN) 1 MG tablet Every 12 (Twelve) Hours. 2/18/25   Clau Encarnacion MD   estradiol (ESTRACE) 0.5 MG tablet  3/22/25   Clau Encarnacion MD   levothyroxine (SYNTHROID, LEVOTHROID) 75 MCG tablet Take 50 mcg by mouth Daily. Pt states she is taking  50 mcg a day    Clau Encarnacion MD   liothyronine (CYTOMEL) 5 MCG tablet Take 1 tablet by mouth Daily.    Clau Encarnacion MD   tiZANidine (ZANAFLEX) 4 MG tablet Take 1 tablet by mouth Every 6 (Six) Hours As Needed.    Clau Encarnacion MD   traMADol (ULTRAM) 50 MG tablet Take 1 tablet by mouth Every 6 (Six) Hours As Needed for Moderate Pain.    Clau Encarnacion MD   Zavegepant HCl (Zavzpret) 10 MG/ACT solution Administer 1  "spray into the nostril(s) as directed by provider As Needed (at onset of migraine). 1/13/25   Karen Amor APRN   Zavegepant HCl (Zavzpret) 10 MG/ACT solution Administer 1 Units into the nostril(s) as directed by provider As Needed (migraine). 1/13/25   Karen Amor APRN       /84 (BP Location: Left arm, Patient Position: Sitting)   Pulse 101   Temp 98.4 °F (36.9 °C) (Oral)   Resp 20   Ht 167.6 cm (66\")   Wt 69.9 kg (154 lb)   SpO2 100%   BMI 24.86 kg/m²     Objective   Physical Exam  Vitals and nursing note reviewed.   Constitutional:       Appearance: She is well-developed.   HENT:      Head: Normocephalic.      Comments: There is approximately a 1 cm laceration to the inner aspect of the lower lip.  Approximately a 0.5 cm laceration to the outer lip.  There is no dental injury.  No trismus or malocclusion.  Opens and closes the mouth without difficulty.  Eyes:      Conjunctiva/sclera: Conjunctivae normal.      Pupils: Pupils are equal, round, and reactive to light.   Neck:      Thyroid: No thyromegaly.      Trachea: No tracheal deviation.   Cardiovascular:      Rate and Rhythm: Normal rate and regular rhythm.      Heart sounds: Normal heart sounds.   Pulmonary:      Effort: Pulmonary effort is normal. No respiratory distress.      Breath sounds: Normal breath sounds. No wheezing or rales.   Chest:      Chest wall: No tenderness.   Musculoskeletal:         General: Normal range of motion.      Cervical back: Normal range of motion and neck supple.   Skin:     General: Skin is warm and dry.   Neurological:      Mental Status: She is alert and oriented to person, place, and time.      Cranial Nerves: No cranial nerve deficit.      Deep Tendon Reflexes: Reflexes are normal and symmetric.   Psychiatric:         Behavior: Behavior normal.         Thought Content: Thought content normal.         Judgment: Judgment normal.         Laceration Repair    Date/Time: 5/13/2025 11:45 AM    Performed " by: Lena Osorio APRN  Authorized by: Lena Osorio APRN    Consent:     Consent obtained:  Verbal and written    Consent given by:  Patient    Risks discussed:  Infection, need for additional repair, nerve damage, pain, poor cosmetic result, poor wound healing, retained foreign body, tendon damage and vascular damage    Alternatives discussed:  No treatment, delayed treatment, observation and referral  Universal protocol:     Procedure explained and questions answered to patient or proxy's satisfaction: yes      Relevant documents present and verified: yes      Test results available: yes      Patient identity confirmed:  Verbally with patient, arm band and provided demographic data  Anesthesia:     Anesthesia method:  Local infiltration    Local anesthetic:  Lidocaine 1% w/o epi  Laceration details:     Location:  Lip    Lip location:  Upper interior lip and upper exterior lip    Length (cm):  1  Pre-procedure details:     Preparation:  Patient was prepped and draped in usual sterile fashion  Exploration:     Imaging outcome: foreign body not noted      Wound exploration: wound explored through full range of motion      Wound extent: no areolar tissue violation noted, no fascia violation noted, no foreign bodies/material noted, no muscle damage noted, no nerve damage noted, no tendon damage noted, no underlying fracture noted and no vascular damage noted      Contaminated: no    Treatment:     Area cleansed with:  Shur-Clens and saline    Irrigation method:  Syringe    Visualized foreign bodies/material removed: no    Skin repair:     Repair method:  Sutures    Suture size:  4-0    Suture material:  Fast-absorbing gut    Suture technique:  Simple interrupted    Number of sutures:  5  Approximation:     Approximation:  Loose  Repair type:     Repair type:  Simple  Post-procedure details:     Dressing:  Open (no dressing)    Procedure completion:  Tolerated well, no immediate complications            Lab Results (last 24 hours)       ** No results found for the last 24 hours. **            No orders to display       ED Course  ED Course as of 05/13/25 1221   Tue May 13, 2025   1146 Patient does not want a CAT scan nor does she want a tetanus shot. [CW]   1219 Laceration repaired and patient tolerated well.  Advised patient not to use a straw.  Soft diet.  Patient is in agreement with the care plan and voices understanding of instructions.  Patient be discharged shortly in stable condition. [CW]      ED Course User Index  [CW] Lena Osorio APRN        Medical Decision Making  Patient is a 65-year-old female presents to the emergency department with laceration.  Patient states she tripped over a bathroom rug and fell on her face on the tile shower this morning.  She denies any loss of consciousness.  She sustained a laceration to her lower lip.  To the ear aspect of the outer aspect.  No loss of consciousness no blood thinners.  No dental injury.  No jaw pain.  No syncope or chest pain prior to the fall.  She states she just tripped over a bathroom rug.  She states she does not want a tetanus shot.  Course of treatment in the ED: Nontoxic-appearing.  No acute distress.  PERRL extraocular movements intact.  No malocclusion.  Opens and closes mouth without difficulty.  Approximately 1 cm laceration noted to the lower inner aspect of the lip.  There is approximately 0.5 cm laceration noted to the outer lip.  No malocclusion.  No dental injury.  Alert and oriented x 3.  Lungs clear to auscultation.  CV normal sinus rhythm.  Patient does not want a tetanus immunization nor does she want a CAT scan.  Laceration repaired and patient tolerated well.  Advised patient not to use a straw.  Soft diet.  Patient is in agreement with the care plan and voices understanding of instructions.  Patient be discharged shortly in stable condition.      Problems Addressed:  Lip laceration, initial encounter: complicated  acute illness or injury    Risk  Prescription drug management.         Final diagnoses:   Lip laceration, initial encounter          Lena Osorio, APRN  05/13/25 1605

## 2025-05-13 NOTE — DISCHARGE INSTRUCTIONS
Return to ER if symptoms worsen   Do not use straw  Follow up with Dr. Nichole this week   Ice to face

## 2025-06-03 ENCOUNTER — OFFICE VISIT (OUTPATIENT)
Dept: NEUROLOGY | Facility: CLINIC | Age: 65
End: 2025-06-03
Payer: MEDICARE

## 2025-06-03 VITALS
OXYGEN SATURATION: 97 % | BODY MASS INDEX: 24.75 KG/M2 | DIASTOLIC BLOOD PRESSURE: 60 MMHG | HEIGHT: 66 IN | WEIGHT: 154 LBS | HEART RATE: 76 BPM | SYSTOLIC BLOOD PRESSURE: 110 MMHG

## 2025-06-03 DIAGNOSIS — G44.229 CHRONIC TENSION-TYPE HEADACHE, NOT INTRACTABLE: ICD-10-CM

## 2025-06-03 DIAGNOSIS — G43.719 INTRACTABLE CHRONIC MIGRAINE WITHOUT AURA AND WITHOUT STATUS MIGRAINOSUS: Primary | ICD-10-CM

## 2025-06-03 RX ORDER — CYCLOSPORINE 0.5 MG/ML
EMULSION OPHTHALMIC
COMMUNITY
Start: 2025-05-08

## 2025-06-03 NOTE — PROGRESS NOTES
Neurology Consult Note    [facility]  [address], [address]  [place] [address]  Phone: [phone]  Fax: [phone]    Referring Provider:   No ref. provider found  Primary Care Provider:  Orlando Nichole MD    Reason for Consult:  Migraine  Subjective      History of Present Illness  65-year-old female seen for follow-up of chronic migraine.  Last seen in my clinic on 1/13/2025.  Due to encounter would recommend patient to continue Botox for chronic migraine prevention.  Recommended her to stop using Fioricet and Excedrin due to concerns for medication overuse.  I did place patient on a prednisone taper.  Patient noted a lot of chronic neck tension so we referred her to physical therapy for further evaluation.  She had reported greater than 25 migraine days average.    Today patient presents by herself.  Reports to me overall improvement in her migraines since last being seen.  Her headaches have decreased in severity as well.  She note now she is only headaches that are located in the back of her head.  She can feel tight.  These do respond to Fioricet.  She denies experiencing nausea, light or sound sensitivities with these.  She denies scalp tenderness with these headaches.  These are not brief in duration.        Migraine preventative Medications Tried:   Qulipta.  No clinical benefit.  Propranolol. No benefit. Caused low blood pressure and heart rate.   Amitriptyline.  No benefit.  Botox.  Good clinical benefit.     Migraine abortive Medications Tried:   Fioricet  Excedrin  Maxalt- caused low blood pressure   Sumatriptan- no benefit.   Nurtec- no benefit.   Ubrelvy- no benefit  Zanaflex. Unknown benefit.         Migraine medications Contraindicated  Blood pressure medications due to history of bradycardia and hypotension.   Antidepressant medications due to patient age.   Anticonvulsants medications due to patient age.     Patient Active Problem List   Diagnosis    Encounter for  screening for malignant neoplasm of colon        Past Medical History:   Diagnosis Date    Breast cancer 02/1999    rt    Disease of thyroid gland     Headache, tension-type 1974    Occasionally from age [age], then again starting Dec 2022    Hx of radiation therapy     Hypercholesteremia     Hyperlipidemia     Migraine 12/1/2022    Also had them earlier in life till hysterectomy 1992        Social History     Socioeconomic History    Marital status:    Tobacco Use    Smoking status: Former    Smokeless tobacco: Never   Vaping Use    Vaping status: Never Used   Substance and Sexual Activity    Alcohol use: Yes     Comment: Occasional    Drug use: No    Sexual activity: Defer        Allergies   Allergen Reactions    Azithromycin Other (See Comments)     Redness          Current Outpatient Medications:     aspirin-acetaminophen-caffeine (EXCEDRIN MIGRAINE) 250-250-65 MG per tablet, Take 1 tablet by mouth Every 6 (Six) Hours As Needed for Headache., Disp: , Rfl:     butalbital-acetaminophen-caffeine (FIORICET, ESGIC) -40 MG per tablet, Take 1 tablet by mouth Every 4 (Four) Hours As Needed for Headache., Disp: , Rfl:     clonazePAM (KlonoPIN) 1 MG tablet, Every 12 (Twelve) Hours., Disp: , Rfl:     cycloSPORINE (RESTASIS) 0.05 % ophthalmic emulsion, INSTILL 1 DROP INTO BOTH EYES TWICE A DAY DAILY, Disp: , Rfl:     levothyroxine (SYNTHROID, LEVOTHROID) 75 MCG tablet, Take 50 mcg by mouth Daily. Pt states she is taking  50 mcg a day (Patient taking differently: Take 25 mcg by mouth Daily. Pt states she is taking  50 mcg a day), Disp: , Rfl:     liothyronine (CYTOMEL) 5 MCG tablet, Take 1 tablet by mouth Daily., Disp: , Rfl:     tiZANidine (ZANAFLEX) 4 MG tablet, Take 1 tablet by mouth Every 6 (Six) Hours As Needed., Disp: , Rfl:     traMADol (ULTRAM) 50 MG tablet, Take 1 tablet by mouth Every 6 (Six) Hours As Needed for Moderate Pain., Disp: , Rfl:     estradiol (ESTRACE) 0.5 MG tablet, , Disp: , Rfl:   "      Objective   Vital Signs:   /60   Pulse 76   Ht 167.6 cm (66\")   Wt 69.9 kg (154 lb)   SpO2 97%   BMI 24.86 kg/m²       Physical Exam  Vitals and nursing note reviewed.   Constitutional:       Appearance: Normal appearance.   HENT:      Head: Normocephalic.   Eyes:      General: Lids are normal.      Extraocular Movements: Extraocular movements intact.      Pupils: Pupils are equal, round, and reactive to light.   Pulmonary:      Effort: Pulmonary effort is normal. No respiratory distress.   Skin:     General: Skin is warm and dry.   Neurological:      Mental Status: She is alert.      Motor: Motor strength is normal.     Deep Tendon Reflexes: Reflexes are normal and symmetric.   Psychiatric:         Speech: Speech normal.        Neurological Exam  Mental Status  Alert. Oriented to person, place, time and situation. Speech is normal. Language is fluent with no aphasia.    Cranial Nerves  CN II: Visual fields full to confrontation.  CN III, IV, VI: Extraocular movements intact bilaterally. Normal lids and orbits bilaterally. Pupils equal round and reactive to light bilaterally.  CN V: Facial sensation is normal.  CN VII: Full and symmetric facial movement.  CN IX, X: Palate elevates symmetrically. Normal gag reflex.  CN XI: Shoulder shrug strength is normal.  CN XII: Tongue midline without atrophy or fasciculations.    Motor   Strength is 5/5 throughout all four extremities.    Sensory  Light touch is normal in upper and lower extremities.     Reflexes  Deep tendon reflexes are 2+ and symmetric in all four extremities.    Gait  Casual gait is normal including stance, stride, and arm swing.      Result Review :   The following data was reviewed by: DAY Dior on 06/03/2025:       Office Visit with Karen Amor APRN (01/13/2025)                Impression:  Magda Mcgraw is a 65 y.o. female who presents for follow-up of migraine.  Overall patient reports improvement in her migraines. "  She is currently managing these with Fioricet as needed.  Continue her on Botox for chronic migraine prevention.  For  , she is unable to tolerate triptans due to causing hypotension.  She also failed Nurtec and Ubrelvy.  Will continue her on Fioricet.  Her MRI of her brain was denied by her insurance.  No red flags on today's visit.    Diagnoses and all orders for this visit:    1. Intractable chronic migraine without aura and without status migrainosus (Primary)    2. Chronic tension-type headache, not intractable        Plan:  Continue Botox for chronic migraine prevention  Continue Fioricet as needed for .  Do not recommend more than 9-10 doses monthly  Contact our office with any worsening headaches  Known triggers  Ensure adequate sleep  Follow-up with primary care as scheduled  Follow-up in my clinic 6 months or sooner if needed    The patient and I have discussed the plan of care and she is in full agreement at this time.     Follow Up   Return in about 6 months (around 12/3/2025) for Migraine.            Karen Amor, DAY  25  12:52 CDT

## 2025-06-10 ENCOUNTER — TELEPHONE (OUTPATIENT)
Dept: NEUROLOGY | Facility: CLINIC | Age: 65
End: 2025-06-10
Payer: COMMERCIAL

## 2025-06-10 NOTE — TELEPHONE ENCOUNTER
Spoke to pt about appt on 6/19- Dr. Hawley will not be in the office and will be rescheduling to 6/25.   Understanding voiced.

## 2025-06-23 ENCOUNTER — TELEPHONE (OUTPATIENT)
Dept: NEUROLOGY | Facility: CLINIC | Age: 65
End: 2025-06-23
Payer: COMMERCIAL

## 2025-06-25 ENCOUNTER — PROCEDURE VISIT (OUTPATIENT)
Dept: NEUROLOGY | Facility: CLINIC | Age: 65
End: 2025-06-25
Payer: MEDICARE

## 2025-06-25 DIAGNOSIS — G43.719 INTRACTABLE CHRONIC MIGRAINE WITHOUT AURA AND WITHOUT STATUS MIGRAINOSUS: Primary | ICD-10-CM

## 2025-06-25 PROCEDURE — 64615 CHEMODENERV MUSC MIGRAINE: CPT | Performed by: PSYCHIATRY & NEUROLOGY

## 2025-07-02 ENCOUNTER — TELEPHONE (OUTPATIENT)
Dept: NEUROLOGY | Facility: CLINIC | Age: 65
End: 2025-07-02
Payer: COMMERCIAL

## 2025-07-02 NOTE — TELEPHONE ENCOUNTER
Left voicemail regarding rescheduling her 9/18/25 Botox appt to 9/17/25 @ 10:15. She is welcome to reschedule if this is not a convenient date/time. Mailed appt reminder.

## 2025-08-28 ENCOUNTER — OFFICE VISIT (OUTPATIENT)
Dept: NEUROLOGY | Facility: CLINIC | Age: 65
End: 2025-08-28
Payer: MEDICARE

## 2025-08-28 VITALS
BODY MASS INDEX: 24.75 KG/M2 | DIASTOLIC BLOOD PRESSURE: 76 MMHG | HEART RATE: 104 BPM | WEIGHT: 154 LBS | SYSTOLIC BLOOD PRESSURE: 130 MMHG | OXYGEN SATURATION: 99 % | HEIGHT: 66 IN

## 2025-08-28 DIAGNOSIS — R25.1 TREMOR: ICD-10-CM

## 2025-08-28 DIAGNOSIS — G43.719 INTRACTABLE CHRONIC MIGRAINE WITHOUT AURA AND WITHOUT STATUS MIGRAINOSUS: Primary | ICD-10-CM

## 2025-08-28 DIAGNOSIS — E07.9 THYROID DISEASE: ICD-10-CM

## 2025-08-28 RX ORDER — TOPIRAMATE 25 MG/1
25 TABLET, FILM COATED ORAL 2 TIMES DAILY
Qty: 60 TABLET | Refills: 1 | Status: SHIPPED | OUTPATIENT
Start: 2025-08-28

## (undated) DEVICE — SENSR O2 OXIMAX FNGR A/ 18IN NONSTR

## (undated) DEVICE — TBG SMPL FLTR LINE NASL 02/C02 A/ BX/100

## (undated) DEVICE — MSK O2 MD CONCENTR A/ LF 7FT 1P/U

## (undated) DEVICE — CUFF,BP,DISP,1 TUBE,ADULT,HP: Brand: MEDLINE

## (undated) DEVICE — Device: Brand: DEFENDO AIR/WATER/SUCTION AND BIOPSY VALVE

## (undated) DEVICE — THE CHANNEL CLEANING BRUSH IS A NYLON FLEXI BRUSH ATTACHED TO A FLEXIBLE PLASTIC SHEATH DESIGNED TO SAFELY REMOVE DEBRIS FROM FLEXIBLE ENDOSCOPES.

## (undated) DEVICE — ENDOGATOR AUXILIARY WATER JET CONNECTOR: Brand: ENDOGATOR